# Patient Record
Sex: FEMALE | Race: WHITE | HISPANIC OR LATINO
[De-identification: names, ages, dates, MRNs, and addresses within clinical notes are randomized per-mention and may not be internally consistent; named-entity substitution may affect disease eponyms.]

---

## 2019-04-08 PROBLEM — Z00.00 ENCOUNTER FOR PREVENTIVE HEALTH EXAMINATION: Status: ACTIVE | Noted: 2019-04-08

## 2019-04-09 ENCOUNTER — APPOINTMENT (OUTPATIENT)
Dept: GASTROENTEROLOGY | Facility: CLINIC | Age: 49
End: 2019-04-09
Payer: COMMERCIAL

## 2019-04-23 ENCOUNTER — APPOINTMENT (OUTPATIENT)
Dept: GASTROENTEROLOGY | Facility: CLINIC | Age: 49
End: 2019-04-23
Payer: COMMERCIAL

## 2019-04-23 VITALS
OXYGEN SATURATION: 98 % | HEIGHT: 62 IN | BODY MASS INDEX: 23.92 KG/M2 | DIASTOLIC BLOOD PRESSURE: 68 MMHG | RESPIRATION RATE: 15 BRPM | HEART RATE: 69 BPM | SYSTOLIC BLOOD PRESSURE: 110 MMHG | WEIGHT: 130 LBS

## 2019-04-23 DIAGNOSIS — Z78.9 OTHER SPECIFIED HEALTH STATUS: ICD-10-CM

## 2019-04-23 DIAGNOSIS — R10.13 EPIGASTRIC PAIN: ICD-10-CM

## 2019-04-23 PROCEDURE — 99204 OFFICE O/P NEW MOD 45 MIN: CPT

## 2019-04-23 NOTE — HISTORY OF PRESENT ILLNESS
[de-identified] : The patient was referred by PCP for evaluation for a screening colonoscopy.  There is no colonoscopy history.  The patient denies any changes in bowel habits, rectal bleeding, nausea, vomiting, diarrhea, constipation or a family history of colorectal cancer.  The patient reports feeling well and complains only of some occasional epigastric burning that resolves quickly with TUMS.

## 2019-04-23 NOTE — CONSULT LETTER
[Dear  ___] : Dear  [unfilled], [( Thank you for referring [unfilled] for consultation for _____ )] : Thank you for referring [unfilled] for consultation for [unfilled] [Consult Closing:] : Thank you very much for allowing me to participate in the care of this patient.  If you have any questions, please do not hesitate to contact me. [FreeTextEntry3] : Very truly yours,\par \par PATRICIA Dumont MD\par [facility]\par Gastroenterology at [place]\par [address], [address]\[place] [address]\par Tel [phone]\par Fax [phone]

## 2019-04-23 NOTE — ASSESSMENT
[FreeTextEntry1] : The patient is of average risk for colorectal cancer.  Will schedule routine screening colonoscopy.  TriLyte prep ordered. \par \par Her epigastric pain is likely intermittent GERD.  Because it resolves quickly with TUMS, happens infrequently, no further workup needed at this time.  In the event that her symptoms worsen, will revisit the need for an EGD or PPI therapy.

## 2019-08-26 ENCOUNTER — RESULT REVIEW (OUTPATIENT)
Age: 49
End: 2019-08-26

## 2019-08-26 ENCOUNTER — APPOINTMENT (OUTPATIENT)
Dept: GASTROENTEROLOGY | Facility: GI CENTER | Age: 49
End: 2019-08-26
Payer: COMMERCIAL

## 2019-08-26 ENCOUNTER — OUTPATIENT (OUTPATIENT)
Dept: OUTPATIENT SERVICES | Facility: HOSPITAL | Age: 49
LOS: 1 days | End: 2019-08-26
Payer: COMMERCIAL

## 2019-08-26 DIAGNOSIS — K64.4 RESIDUAL HEMORRHOIDAL SKIN TAGS: ICD-10-CM

## 2019-08-26 DIAGNOSIS — Z12.11 ENCOUNTER FOR SCREENING FOR MALIGNANT NEOPLASM OF COLON: ICD-10-CM

## 2019-08-26 DIAGNOSIS — D12.0 BENIGN NEOPLASM OF CECUM: ICD-10-CM

## 2019-08-26 DIAGNOSIS — Z12.12 ENCOUNTER FOR SCREENING FOR MALIGNANT NEOPLASM OF COLON: ICD-10-CM

## 2019-08-26 PROCEDURE — 45380 COLONOSCOPY AND BIOPSY: CPT | Mod: PT

## 2019-08-26 PROCEDURE — 45380 COLONOSCOPY AND BIOPSY: CPT

## 2019-08-26 PROCEDURE — 88305 TISSUE EXAM BY PATHOLOGIST: CPT

## 2019-08-26 PROCEDURE — 88305 TISSUE EXAM BY PATHOLOGIST: CPT | Mod: 26

## 2019-08-26 RX ORDER — POLYETHYLENE GLYOCOL 3350, SODIUM CHLORIDE, SODIUM BICARBONATE AND POTASSIUM CHLORIDE 420; 11.2; 5.72; 1.48 G/4L; G/4L; G/4L; G/4L
420 POWDER, FOR SOLUTION NASOGASTRIC; ORAL
Qty: 1 | Refills: 0 | Status: COMPLETED | COMMUNITY
Start: 2019-04-23 | End: 2019-08-26

## 2019-08-26 NOTE — ASSESSMENT
[FreeTextEntry1] : This is a 49-year-old woman presenting for index screening colonoscopy.  The exam was notable for a diminutive sessile polyp in the cecum that was resected and retrieved using jumbo cold forceps polypectomy.  There were small external hemorrhoids visualized on retroflexed view.  The remainder of the exam was unremarkable.  The bowel prep was excellent the [place] bowel preparation scale score of nine.\par \par Followup pathology\par Repeat colonoscopy in five years if polyp is adenomatous

## 2019-08-26 NOTE — PROCEDURE
[With Biopsy] : with biopsy [With Polypectomy] : polypectomy [Colon Cancer Screening] : colon cancer screening [Procedure Explained] : The procedure was explained [Allergies Reviewed] : allergies reviewed. [Risks] : Risks [Bleeding] : bleeding risk [Benefits] : benefits [Alternatives] : alternatives [Consent Obtained] : written consent was obtained prior to the procedure and is detailed in the patient's record [Infection] : risk of infection [Patient] : the patient [Bowel Prep Kit] : the patient took the appropriate bowel preparation kit as directed [Automated Blood Pressure Cuff] : automated blood pressure cuff [Approved Diet Followed] : the patient avoided solid foods and adhered to the approved diet list for 24 hours prior to the procedure [Pulse Oximeter] : pulse oximeter [Cardiac Monitor] : cardiac monitor [Propofol ___ mg IV] : Propofol [unfilled] ~Umg intravenously [2] : 2 [Sedation Clearance] : the patient was cleared for moderate sedation [Prep Qualtiy: ___] : Prep Quality:  [unfilled] [Performed By: ___] : Performed by:  WIN [Abnormal Rectum] : a normal rectum [Withdrawal Time: ___] : Withdrawal Time:  [unfilled] [No Difficulty] : without difficulty [Cecum (Landmarks)] : and guided to the cecum which was identified by the anatomic landmarks of the appendiceal orifice and ileocecal valve [Insufflated] : insufflated [Single Pass Needed] : after a single pass [Polyps] : polyps [Retroflex View] : a retroflex view of the rectum was performed [Biopsy] : biopsy [Normal] : Normal [Sent to Pathology] : was sent to pathology for analysis [Tolerated Well] : the patient tolerated the procedure well [Hemorrhoids] : hemorrhoids [Vital Signs Stable] : the vital signs were stable [No Complications] : There were no complications [FreeTextEntry2] : [employer] [id number]

## 2019-08-26 NOTE — ASSESSMENT
[FreeTextEntry1] : This is a 49-year-old woman presenting for index screening colonoscopy.  The exam was notable for a diminutive sessile polyp in the cecum that was resected and retrieved using jumbo cold forceps polypectomy.  There were small external hemorrhoids visualized on retroflexed view.  The remainder of the exam was unremarkable.  The bowel prep was excellent the [place] bowel preparation scale score of nine.\par \par Followup pathology\par Repeat colonoscopy in five years if polyp is adenomatous

## 2019-08-26 NOTE — PROCEDURE
[With Biopsy] : with biopsy [With Polypectomy] : polypectomy [Colon Cancer Screening] : colon cancer screening [Procedure Explained] : The procedure was explained [Allergies Reviewed] : allergies reviewed. [Risks] : Risks [Alternatives] : alternatives [Bleeding] : bleeding risk [Benefits] : benefits [Consent Obtained] : written consent was obtained prior to the procedure and is detailed in the patient's record [Infection] : risk of infection [Patient] : the patient [Bowel Prep Kit] : the patient took the appropriate bowel preparation kit as directed [Approved Diet Followed] : the patient avoided solid foods and adhered to the approved diet list for 24 hours prior to the procedure [Automated Blood Pressure Cuff] : automated blood pressure cuff [Cardiac Monitor] : cardiac monitor [Pulse Oximeter] : pulse oximeter [2] : 2 [Propofol ___ mg IV] : Propofol [unfilled] ~Umg intravenously [Sedation Clearance] : the patient was cleared for moderate sedation [Prep Qualtiy: ___] : Prep Quality:  [unfilled] [Withdrawal Time: ___] : Withdrawal Time:  [unfilled] [Abnormal Rectum] : a normal rectum [Performed By: ___] : Performed by:  WIN [No Difficulty] : without difficulty [Cecum (Landmarks)] : and guided to the cecum which was identified by the anatomic landmarks of the appendiceal orifice and ileocecal valve [Single Pass Needed] : after a single pass [Insufflated] : insufflated [Polyps] : polyps [Retroflex View] : a retroflex view of the rectum was performed [Biopsy] : biopsy [Normal] : Normal [Tolerated Well] : the patient tolerated the procedure well [Sent to Pathology] : was sent to pathology for analysis [Hemorrhoids] : hemorrhoids [Vital Signs Stable] : the vital signs were stable [No Complications] : There were no complications [FreeTextEntry2] : [employer] [id number]

## 2019-08-30 DIAGNOSIS — D12.6 BENIGN NEOPLASM OF COLON, UNSPECIFIED: ICD-10-CM

## 2019-08-30 LAB — SURGICAL PATHOLOGY STUDY: SIGNIFICANT CHANGE UP
